# Patient Record
Sex: MALE | Race: WHITE | NOT HISPANIC OR LATINO | URBAN - METROPOLITAN AREA
[De-identification: names, ages, dates, MRNs, and addresses within clinical notes are randomized per-mention and may not be internally consistent; named-entity substitution may affect disease eponyms.]

---

## 2017-01-01 ENCOUNTER — COMMUNICATION - HEALTHEAST (OUTPATIENT)
Dept: BEHAVIORAL HEALTH | Facility: CLINIC | Age: 72
End: 2017-01-01

## 2017-01-01 ENCOUNTER — RECORDS - HEALTHEAST (OUTPATIENT)
Dept: ADMINISTRATIVE | Facility: OTHER | Age: 72
End: 2017-01-01

## 2017-01-01 ENCOUNTER — OFFICE VISIT - HEALTHEAST (OUTPATIENT)
Dept: BEHAVIORAL HEALTH | Facility: CLINIC | Age: 72
End: 2017-01-01

## 2017-01-01 DIAGNOSIS — F06.30 MOOD DISORDER IN CONDITIONS CLASSIFIED ELSEWHERE: ICD-10-CM

## 2017-01-01 DIAGNOSIS — F25.9 SCHIZOAFFECTIVE DISORDER, CHRONIC CONDITION (H): ICD-10-CM

## 2017-01-01 DIAGNOSIS — F25.9 SCHIZOAFFECTIVE DISORDER (H): ICD-10-CM

## 2017-01-01 RX ORDER — BUPROPION HYDROCHLORIDE 100 MG/1
TABLET, EXTENDED RELEASE ORAL
Qty: 30 TABLET | Refills: 2 | Status: SHIPPED | OUTPATIENT
Start: 2017-01-01

## 2017-01-01 RX ORDER — BUPROPION HYDROCHLORIDE 150 MG/1
150 TABLET, EXTENDED RELEASE ORAL DAILY
Qty: 30 TABLET | Refills: 3 | Status: SHIPPED | OUTPATIENT
Start: 2017-01-01

## 2017-01-01 RX ORDER — DESVENLAFAXINE 50 MG/1
100 TABLET, FILM COATED, EXTENDED RELEASE ORAL DAILY
Qty: 60 TABLET | Refills: 3 | Status: SHIPPED | OUTPATIENT
Start: 2017-01-01

## 2017-01-01 RX ORDER — ARIPIPRAZOLE 10 MG/1
25 TABLET ORAL DAILY
Qty: 75 TABLET | Refills: 3 | Status: SHIPPED | OUTPATIENT
Start: 2017-01-01

## 2017-01-01 RX ORDER — MIRTAZAPINE 30 MG/1
30 TABLET, FILM COATED ORAL
Status: SHIPPED | COMMUNITY
Start: 2017-01-01

## 2017-01-01 ASSESSMENT — MIFFLIN-ST. JEOR
SCORE: 1619.89
SCORE: 1719.68
SCORE: 1642.57
SCORE: 1579.07

## 2018-01-22 ENCOUNTER — COMMUNICATION - HEALTHEAST (OUTPATIENT)
Dept: BEHAVIORAL HEALTH | Facility: CLINIC | Age: 73
End: 2018-01-22

## 2021-05-30 VITALS — HEIGHT: 73 IN | BODY MASS INDEX: 26.9 KG/M2 | WEIGHT: 203 LBS

## 2021-05-31 VITALS — HEIGHT: 73 IN | WEIGHT: 181 LBS | BODY MASS INDEX: 23.99 KG/M2

## 2021-05-31 VITALS — BODY MASS INDEX: 22.8 KG/M2 | WEIGHT: 172 LBS | HEIGHT: 73 IN

## 2021-05-31 VITALS — HEIGHT: 73 IN | WEIGHT: 186 LBS | BODY MASS INDEX: 24.65 KG/M2

## 2021-06-08 NOTE — PROGRESS NOTES
Outpatient Followup Psychiatric Evaluation      Pertinent History: Patient has a history of schizoaffective disorder.  He's been doing fairly well on the current medication regime although at baseline has some underlying paranoia.  We did increase the patient's Wellbutrin prior to 2 visits ago.  Since the last visit we did decrease the Pristiq.    Current Symptoms:   No reports of any new issues or concerns.  The patient continues baseline paranoid.  He denies having any thoughts of hurting anybody or hurting himself.  He does report he occasionally does walk outside to look around because he gets nervous that people are spying on him.  This is been chronic.  It's been much worse in the past.  He tells me he is sleeping well.  No change in appetite.  He's had no change in cognition.  He denies side effects to the medication.  He offers no other questions or concerns at this time.    I do not to talk with the patient's daughter who states the patient is doing relatively well.  I did inform them that workers, wanted us to consider a change from the Pristiq.  The daughter and patient reminded me we been on a variety of medications in the past and each time we have tried to change medications the patient does decompensate.  They're requesting no change.    Current Medications: Please see chart. Medications personally reviewed.    Medication Compliance: yes    Side Effects to Medications:  no      Vitals:  Wt Readings from Last 3 Encounters:   02/06/17 203 lb (92.1 kg)   10/31/16 204 lb (92.5 kg)   06/27/16 207 lb (93.9 kg)     Temp Readings from Last 3 Encounters:   02/06/17 97.9  F (36.6  C) (Oral)   10/31/16 97.7  F (36.5  C)   06/27/16 97.9  F (36.6  C) (Oral)     BP Readings from Last 3 Encounters:   02/06/17 139/79   10/31/16 129/80   06/27/16 133/76     Pulse Readings from Last 3 Encounters:   02/06/17 80   10/31/16 69   06/27/16 74         Mental Status Exam:   The patient does not appear to be in any significant  distress.  He is flat and slow.  Limited initiation but he does participate.  He appears somewhat depressed.  No obvious lability or agitation.  Not particularly anxious.  Attention and concentration are baseline impaired.  Speech is slow, monotone and simple.  Not pressured or rambling.  Thought content does not show any hallucinations or delusions.  No suicidal or homicidal ideation.  Thought information does not show the patient to be loose.  Insight, judgment and memory are all grossly at baseline.  Fund of knowledge is at baseline.    Diagnosis managed and treated at today's visit :    Axis I: Schizoaffective disorder    Axis II: Deferred    Axis III: Please see initial psychiatric consultation note      Plan:  Medication Adjustment:  We will continue with the medications as ordered.    Other:   Patient will return in 3-4 months for med check. Both he and his daughter agree to call or return sooner if questions concerns or problems arise.    Continue with the support of the clinic, reassurance, and redirection. Staff monitoring and ongoing assessments per team plan. Current psychotropic medication appears to represent the minimum effective dosage and appears medically necessary. We will continue to monitor and reassess. This team will utilize appropriate emergency services if necessary. I will make myself available if concerns or problems arise.    Drew Kearney

## 2021-06-08 NOTE — PROGRESS NOTES
Pt is here for psychiatric med management follow up. He is accompanied by his daughter. Still depressed and anxious. Daughter reports no significant changes since last visit.    The letter for medical necessity of  Pristiq reviewed by Dr. Kearney and was discussed with the pt and his daughter. He has failed multiple meds and has decompensated in past with med changes. Ppwk sent to med records and faxed to work comp.    Correct pharmacy verified with patient and confirmed in snapshot? [x] yes []no    Medications Phoned  to Pharmacy [] yes [x]no  Name of Pharmacist:  List Medications, including dose, quantity and instructions      Medication Prescriptions given to patient   [] yes  [x] no   List the name of the drug the prescription was written for.       Medications ordered this visit were e-scribed.  Verified by order class [] yes  [x] no    Medication changes or discontinuations were communicated to patient's pharmacy: [] yes  [x] no    UA collected [] yes    [x] no    [place] Prescription Monitoring Program Reviewed? [] yes  [x] no    Referrals were made to:  none  Future appointment was made: [] yes  [x] no    Dictation completed at time of chart check: [x] yes  [] no    I have checked the documentation for today s encounters and the above information has been reviewed and completed.

## 2021-06-11 NOTE — PROGRESS NOTES
Outpatient Followup Psychiatric Evaluation      Pertinent History: Patient has a history of schizoaffective disorder.  He's been doing fairly well on the current medication regime although at baseline has some underlying paranoia.  Had recent increases in the Abilify, the Wellbutrin and the Pristiq the last year due to periodic depressed mood and increased paranoia.  Unfortunately the patient's medications at times are not delivered in a timely manner and that is likely contributed to the difficulties.  He has been off his medications for a few days now as he has not gotten them through worker's comp.  I did write a letter to ensure regarding this issue.    Current Symptoms:   Patient presents today with his daughter.  There is another daughter that was present on speakerphone.  Both daughters report patient continues to struggle with periodic paranoia which seems worse if he misses or is late on his meds.  The issue does not appear to be with patient compliance but rather delivery from worker's comp program.  The patient recently has been more paranoid and made more confused statements.  The patient himself is an extremely poor historian and is very vague but he admits he has not been doing well and feels more anxious in a generalized way.  He has been sleeping well he states as long as he takes his Pristiq.  He denies having any desire to be dead or thoughts of suicide and is able to contract for safety.  The patient has been more socially isolative.  Has been needing more direction and support to do daily activities.  There is been no new medical issues or concerns and no obvious side effects to the medication.  I did spend some time talking with her daughters about medication options.  They believe the Abilify has been a very effective drug in their concerns are  More with the inability to secure medications on time then the specific medicines not working.  They would however like an increase in the Abilify  which I have done.  There is no evidence of any tardive dyskinesia on exam today.    Current Medications: Please see chart. Medications personally reviewed.    Medication Compliance: yes    Side Effects to Medications:  no      Vitals:  Wt Readings from Last 3 Encounters:   07/17/17 181 lb (82.1 kg)   06/12/17 186 lb (84.4 kg)   02/06/17 203 lb (92.1 kg)     Temp Readings from Last 3 Encounters:   02/06/17 97.9  F (36.6  C) (Oral)   10/31/16 97.7  F (36.5  C)   06/27/16 97.9  F (36.6  C) (Oral)     BP Readings from Last 3 Encounters:   07/17/17 116/64   06/12/17 122/73   02/06/17 139/79     Pulse Readings from Last 3 Encounters:   07/17/17 80   06/12/17 81   02/06/17 80         Mental Status Exam:   Patient was slow and flat and he appeared somewhat anxious.  He was in no obvious pain and was not short of breath.  He was distractible and a very poor historian.  Speech showed him to be rarely verbal, soft-spoken and vague.  He had long pauses and seemed to be disorganized.  Mood was depressed and anxious.  Affect was flat.  Attention and concentration were impaired as he was distractible.  He had a difficult time tracking and following conversation, he just need to repeat things.  Thought content did not show generalized paranoia but no obvious auditory or visual hallucinations.  There is no suicidal or homicidal ideation.  Thought formation does not show the patient to be loose but he is disorganized and vague.  Insight, judgment and memory all continues impaired.  No recent change.    Diagnosis managed and treated at today's visit :    Axis I: Schizoaffective disorder    Axis II: Deferred    Axis III: Please see initial psychiatric consultation note      Plan:  Medication Adjustment:  We are going to increase the Abilify to 25 mg a day.    Other:   Patient will return in 2 weeks for med check.  The family will call with any concerns or complaints or if the patient declines.  I have written a letter to the patient's  worker's comp program.  We are also calling them to notify them of the recent medication change.    Continue with the support of the clinic, reassurance, and redirection. Staff monitoring and ongoing assessments per team plan. Current psychotropic medication appears to represent the minimum effective dosage and appears medically necessary. We will continue to monitor and reassess. This team will utilize appropriate emergency services if necessary. I will make myself available if concerns or problems arise.    Drew Kearney

## 2021-06-11 NOTE — PROGRESS NOTES
Pt here for follow up and medication management.    Pt is still having a lot of confusion.   Pt weight has decreased since last visit down 17 lbs.  Per pt decreased desire for food, does snack through the day he states.     Pt also report increased feeling for someone is after him and he does not feel safe. Per Allison this has increased and getting worse.  Safe inside the house but not outside.

## 2021-06-11 NOTE — PROGRESS NOTES
Pt is here for routine psychiatric med management follow up. Client is not doing well because his pills getting to him late. Work comp approves it with delays. Pt has been out of Wellbutrin and Abilify since Friday.   He is complaining of increased upper body tremors    Correct pharmacy verified with patient and confirmed in snapshot? [x] yes []no    Medications Phoned  to Pharmacy [] yes [x]no  Name of Pharmacist:  List Medications, including dose, quantity and instructions      Medication Prescriptions given to patient   [] yes  [x] no   List the name of the drug the prescription was written for.       Medications ordered this visit were e-scribed.  Verified by order class [x] yes  [] no  Abilify 25 mg/day  Medication changes or discontinuations were communicated to patient's pharmacy: [x] yes  [] no  LM for [facility] updating of the Abilify dose increase to 2.5 tabs/day  UA collected [] yes    [x] no    [place] Prescription Monitoring Program Reviewed? [] yes  [x] no    Referrals were made to:  None  Doctor note ans prescriptions for Abilify and Pristiq faxed to [facility]    Future appointment was made: [x] yes  [] no  9/18/17  Dictation completed at time of chart check: [x] yes  [] no    I have checked the documentation for today s encounters and the above information has been reviewed and completed.

## 2021-06-11 NOTE — PROGRESS NOTES
Correct pharmacy verified with patient and confirmed in snapshot? [x] yes []no    Medications Phoned  to Pharmacy [] yes [x]no  Name of Pharmacist:  List Medications, including dose, quantity and instructions      Medication Prescriptions given to patient   [] yes  [x] no   List the name of the drug the prescription was written for.       Medications ordered this visit were e-scribed.  Verified by order class [x] yes  [] no   Abilify 20 mg, Mirtazapine 15 mg, Wellbutrin  & 150 mg  Medication changes or discontinuations were communicated to patient's pharmacy: [] yes  [x] no    UA collected [] yes    [x] no    [place] Pres[place] Monitoring [place] Reviewed? [] yes  [x] no    Referrals were made to:  None    Future appointment was made: [x] yes  [] no    Dictation completed at time of chart check: [x] yes  [] no    I have checked the documentation for today s encounters and the above information has been reviewed and completed.

## 2021-06-11 NOTE — PROGRESS NOTES
Outpatient Followup Psychiatric Evaluation      Pertinent History: Patient has a history of schizoaffective disorder.  He's been doing fairly well on the current medication regime although at baseline has some underlying paranoia.  We did increase the patient's Wellbutrin prior to 3 visits ago.  In the last visit we increased the patient's Pristiq.  We did not make any changes at the last visit.    Current Symptoms:   The patient presents with his daughter reports the patient has been more paranoid and has difficulty leaving the house at all.  He has been tolerating the medication.    I interview the patient is quite vague but admits that he is struggling with regard to his mood and his paranoia.  He states he knows somebody is trying to get him.  He has not seen this person.  There is no auditory hallucinations or visual hallucinations but there is a generalized paranoia.  The daughter reports that worker's comp because his house frequently and this may be the source of some of his difficulty.  She also reports since changing to the generic form of Pristiq the patient has been struggling.  There are the process of trying to appeal for the other form.  The patient reports he sleeping relatively well.  He goes to bed about 9 PM and wakes up at 5:30. He does wake up tired.  He is flat and slow throughout the day and does not leave the house.  He denies having any thoughts of harming himself or anybody else and is able to contract for safety.  He denies having any manic type symptoms.  He denies that he is having any side effects to the medications.  Is willing to engage in the below mentioned medication changes and we will thought his daughter will call back if this does not significantly improve the patient.    Current Medications: Please see chart. Medications personally reviewed.    Medication Compliance: yes    Side Effects to Medications:  no      Vitals:  Wt Readings from Last 3 Encounters:   06/12/17 186 lb  (84.4 kg)   02/06/17 203 lb (92.1 kg)   10/31/16 204 lb (92.5 kg)     Temp Readings from Last 3 Encounters:   02/06/17 97.9  F (36.6  C) (Oral)   10/31/16 97.7  F (36.5  C)   06/27/16 97.9  F (36.6  C) (Oral)     BP Readings from Last 3 Encounters:   06/12/17 122/73   02/06/17 139/79   10/31/16 129/80     Pulse Readings from Last 3 Encounters:   06/12/17 81   02/06/17 80   10/31/16 69         Mental Status Exam:   Patient does maintain some eye contact but is extremely slow and flat.  He appears anxious and seeks reassurance.  He is not agitated.  No obvious pain or shortness of breath.  Speech is slow extremely simple and concrete.  Limited initiation.  Vague.  Not pressured or rambling.  Although he maintains fairly good eye contact he seems a bit distractible and has a hard time maintaining focus and participation.  Mood is depressed and flat.  Affect is blunted.  No lability.  Thought content does not show any hallucinations or delusions.  No suicidal or homicidal ideation.  Thought formation does not show the patient to be Insight, judgment and memory appear impaired.  Please see above.  Fund of knowledge is difficult to assess due to poor participation.    Diagnosis managed and treated at today's visit :    Axis I: Schizoaffective disorder    Axis II: Deferred    Axis III: Please see initial psychiatric consultation note      Plan:  Medication Adjustment:  We I am going to increase the Abilify from 15-20 mg a day.  We have added some low-dose Remeron as well.  We will continue with the other medications as ordered.    Other:   Patient will return in 2-3 months for med check. Both he and his daughter agree to call or return sooner if questions concerns or problems arise is not significant improvement.    Continue with the support of the clinic, reassurance, and redirection. Staff monitoring and ongoing assessments per team plan. Current psychotropic medication appears to represent the minimum effective dosage  and appears medically necessary. We will continue to monitor and reassess. This team will utilize appropriate emergency services if necessary. I will make myself available if concerns or problems arise.    Drew Keanrey

## 2021-06-13 NOTE — PROGRESS NOTES
Pt here with his daughter Marisa for follow up.    Per Marisa been having some issues with breathing and currently following up on that with another provider.    No other concerns at this time.

## 2021-06-13 NOTE — PROGRESS NOTES
Correct pharmacy verified with patient and confirmed in snapshot? [x] yes []no    Charge captured ? [x] yes  [] no    Medications Phoned  to Pharmacy [] yes [x]no  Name of Pharmacist:  List Medications, including dose, quantity and instructions      Medication Prescriptions given to patient   [] yes  [x] no   List the name of the drug the prescription was written for.       Medications ordered this visit were e-scribed.  Verified by order class [] yes  [x] no    Medication changes or discontinuations were communicated to patient's pharmacy: [] yes  [x] no    UA collected [] yes  [x] no    [place] Prescription Monitoring Program Reviewed? [] yes  [x] no    Referrals were made to:  None    Future appointment was made: [x] yes  [] no    Dictation completed at time of chart check: [x] yes  [] no    I have checked the documentation for today s encounters and the above information has been reviewed and completed.

## 2021-06-13 NOTE — PROGRESS NOTES
Outpatient Followup Psychiatric Evaluation      Pertinent History: Patient has a history of schizoaffective disorder.  He's been doing fairly well on the current medication regime although at baseline has some underlying paranoia.  Had recent increases in the Abilify, the Wellbutrin and the Pristiq the last year due to periodic depressed mood and increased paranoia.  Unfortunately the patient's medications at times are not delivered in a timely manner and that is likely contributed to the difficulties.  Apparently has recently improved.  At the last visit we did increase the Abilify.    Current Symptoms:   Patient again presents with his daughter.  They both report the patient doing relatively well.  The patient has had some shortness of breath and breathing issues for which he seen his medical doctor.  He is also had some difficulty having to use the bathroom or at night.    The patient and daughter state that the patient is doing better with regard to his psychiatric symptoms.  He continues at baseline to be somewhat suspicious and checks out the windows but much less frequent.  He is not having any hallucinations or delusions.  He is much less irritable.  He is voicing fewer concerns and seems more comfortable.  He has had a 30 pound weight loss in the last year and is currently undergoing a medical workup regarding this.  That has been frustrating for them.    The patient denies having any suicidality.  It affects to the medication and we again discussed the risks and benefits of the medication.  He offers no other concerns or complaints at both the patient and daughter do not want any psychiatric medication changes at the this time.    Current Medications: Please see chart. Medications personally reviewed.    Medication Compliance: yes    Side Effects to Medications:  no      Vitals:  Wt Readings from Last 3 Encounters:   09/18/17 172 lb (78 kg)   07/17/17 181 lb (82.1 kg)   06/12/17 186 lb (84.4 kg)     Temp  Readings from Last 3 Encounters:   02/06/17 97.9  F (36.6  C) (Oral)   10/31/16 97.7  F (36.5  C)   06/27/16 97.9  F (36.6  C) (Oral)     BP Readings from Last 3 Encounters:   09/18/17 109/77   07/17/17 116/64   06/12/17 122/73     Pulse Readings from Last 3 Encounters:   09/18/17 85   07/17/17 80   06/12/17 81         Mental Status Exam:   The patient was still slow but a bit more alert with better eye contact today.  No pain.  No shortness of breath.  Speech was slow monotone simple and vague.  Limited initiation but his answers were consistent and appropriate.  Mood continues to appear somewhat depressed but is less anxious.  He is still quite flat but does participate with less of a delay today.  Attention and concentration are a bit better.  He seems to be tracking and participating a bit better.  Thought content does not show any obvious hallucinations or delusions.  No suicidal or homicidal ideation.  Thought formation does not show the patient to be loose.  Insight, judgment and memory are all baseline and unchanged.    Diagnosis managed and treated at today's visit :    Axis I: Schizoaffective disorder    Axis II: Deferred    Axis III: Please see initial psychiatric consultation note      Plan:  Medication Adjustment:  Make no medication changes at this time.    Other:   Patient will return in 4 months the family will call with any concerns or complaints or if the patient declines.     Continue with the support of the clinic, reassurance, and redirection. Staff monitoring and ongoing assessments per team plan. Current psychotropic medication appears to represent the minimum effective dosage and appears medically necessary. We will continue to monitor and reassess. This team will utilize appropriate emergency services if necessary. I will make myself available if concerns or problems arise.    Drew Kearney MD